# Patient Record
Sex: FEMALE
[De-identification: names, ages, dates, MRNs, and addresses within clinical notes are randomized per-mention and may not be internally consistent; named-entity substitution may affect disease eponyms.]

---

## 2021-03-27 ENCOUNTER — NURSE TRIAGE (OUTPATIENT)
Dept: OTHER | Facility: CLINIC | Age: 86
End: 2021-03-27

## 2021-03-27 NOTE — TELEPHONE ENCOUNTER
Call received on [place] [address] [facility]. Brief description of triage: No triage. Pt states her daughter has tested COVID+ and she is unsure what to do since she has only received 1 does of vaccine. Triage indicates for patient to No Triage. Pt was told to start quarantine process d/t exposure. 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 Pt instructed to quarantine in either home, but not to travel between homes. Reduce contact with all people, family and grandchildren. Pt verbalized understanding and denies questions or concerns. Care advice provided. Recommended using local department of health website for testing locations and up to date information. Caller verbalizes understanding, denies any other questions or concerns; instructed to call back for any new or worsening symptoms. Reason for Disposition   Health Information question, no triage required and triager able to answer question    Answer Assessment - Initial Assessment Questions  1. REASON FOR CALL or QUESTION: \"What is your reason for calling today? \" or \"How can I best help you? \" or \"What question do you have that I can help answer? \"      Pt states she was exposed to COVID+ daughter. 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 Pt has had one dose of COVID vaccine. Pt wanting to know where to quarantine and how to go about testing or getting 2nd COVID vaccine.     Protocols used: INFORMATION ONLY CALL - NO TRIAGE-[facility]